# Patient Record
Sex: MALE | Race: OTHER | HISPANIC OR LATINO | Employment: STUDENT | URBAN - METROPOLITAN AREA
[De-identification: names, ages, dates, MRNs, and addresses within clinical notes are randomized per-mention and may not be internally consistent; named-entity substitution may affect disease eponyms.]

---
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
## 2023-01-31 ENCOUNTER — TELEPHONE (OUTPATIENT)
Dept: INTERNAL MEDICINE CLINIC | Facility: CLINIC | Age: 18
End: 2023-01-31

## 2023-01-31 DIAGNOSIS — B35.6 FUNGAL INFECTION OF THE GROIN: Primary | ICD-10-CM

## 2023-01-31 RX ORDER — NYSTATIN 100000 [USP'U]/G
POWDER TOPICAL 4 TIMES DAILY
Qty: 15 G | Refills: 0 | Status: SHIPPED | OUTPATIENT
Start: 2023-01-31

## 2023-01-31 NOTE — TELEPHONE ENCOUNTER
Patient left message on refill line would like nystatin powder ordered instead of the cream please send to [facility] pharmacy [address]

## 2023-02-06 DIAGNOSIS — A60.02 HERPES GENITALIS IN MEN: ICD-10-CM

## 2023-02-06 RX ORDER — VALACYCLOVIR HYDROCHLORIDE 1 G/1
1000 TABLET, FILM COATED ORAL DAILY
Qty: 90 TABLET | Refills: 1 | Status: SHIPPED | OUTPATIENT
Start: 2023-02-06 | End: 2023-08-05

## 2023-05-30 DIAGNOSIS — B35.6 FUNGAL INFECTION OF THE GROIN: ICD-10-CM

## 2023-05-30 RX ORDER — NYSTATIN 100000 [USP'U]/G
POWDER TOPICAL 4 TIMES DAILY
Qty: 15 G | Refills: 5 | Status: SHIPPED | OUTPATIENT
Start: 2023-05-30

## 2023-06-07 DIAGNOSIS — B35.6 FUNGAL INFECTION OF THE GROIN: ICD-10-CM

## 2023-06-07 NOTE — TELEPHONE ENCOUNTER
Medication Refill Request     Name nystatin (MYCOSTATIN) powder   Dose/Frequency Apply topically 4 (four) times a day  Quantity 15 g   Verified pharmacy   [x]  Verified ordering Provider   [x]  Does patient have enough for the next 3 days?  Yes [] No [x]

## 2023-06-08 RX ORDER — NYSTATIN 100000 [USP'U]/G
POWDER TOPICAL 4 TIMES DAILY
Qty: 15 G | Refills: 0 | OUTPATIENT
Start: 2023-06-08

## 2023-08-30 DIAGNOSIS — B35.6 FUNGAL INFECTION OF THE GROIN: ICD-10-CM

## 2023-08-30 DIAGNOSIS — A60.02 HERPES GENITALIS IN MEN: ICD-10-CM

## 2023-08-30 RX ORDER — VALACYCLOVIR HYDROCHLORIDE 1 G/1
1000 TABLET, FILM COATED ORAL DAILY
Qty: 90 TABLET | Refills: 1 | Status: SHIPPED | OUTPATIENT
Start: 2023-08-30 | End: 2024-02-26

## 2023-08-30 RX ORDER — NYSTATIN 100000 [USP'U]/G
POWDER TOPICAL 4 TIMES DAILY
Qty: 15 G | Refills: 5 | Status: SHIPPED | OUTPATIENT
Start: 2023-08-30

## 2023-12-18 DIAGNOSIS — B35.6 FUNGAL INFECTION OF THE GROIN: ICD-10-CM

## 2023-12-18 DIAGNOSIS — A60.02 HERPES GENITALIS IN MEN: ICD-10-CM

## 2023-12-18 RX ORDER — NYSTATIN 100000 [USP'U]/G
POWDER TOPICAL 4 TIMES DAILY
Qty: 30 G | Refills: 5 | Status: SHIPPED | OUTPATIENT
Start: 2023-12-18

## 2023-12-18 RX ORDER — VALACYCLOVIR HYDROCHLORIDE 1 G/1
1000 TABLET, FILM COATED ORAL DAILY
Qty: 90 TABLET | Refills: 1 | Status: SHIPPED | OUTPATIENT
Start: 2023-12-18 | End: 2024-06-15

## 2024-01-19 ENCOUNTER — OFFICE VISIT (OUTPATIENT)
Age: 19
End: 2024-01-19
Payer: COMMERCIAL

## 2024-01-19 VITALS
BODY MASS INDEX: 28.29 KG/M2 | TEMPERATURE: 98 F | HEART RATE: 79 BPM | HEIGHT: 69 IN | SYSTOLIC BLOOD PRESSURE: 106 MMHG | OXYGEN SATURATION: 98 % | WEIGHT: 191 LBS | DIASTOLIC BLOOD PRESSURE: 72 MMHG

## 2024-01-19 DIAGNOSIS — Z00.00 ANNUAL PHYSICAL EXAM: Primary | ICD-10-CM

## 2024-01-19 DIAGNOSIS — Z23 ENCOUNTER FOR IMMUNIZATION: ICD-10-CM

## 2024-01-19 PROCEDURE — 90715 TDAP VACCINE 7 YRS/> IM: CPT

## 2024-01-19 PROCEDURE — 99395 PREV VISIT EST AGE 18-39: CPT | Performed by: NURSE PRACTITIONER

## 2024-01-19 PROCEDURE — 90471 IMMUNIZATION ADMIN: CPT

## 2024-01-19 NOTE — ASSESSMENT & PLAN NOTE
Patient is to continue with well-balanced diet and daily exercise.  Patient defers influenza vaccination however is agreeable to Tdap vaccination while in office today.  Patient due for fasting blood work.  Encourage monthly self-testicular examinations.  Follow-up in 1 year for annual physical or sooner if needed.

## 2024-01-19 NOTE — PROGRESS NOTES
[facility] PRIMARY CARE [facility] Adult Male Physical Visit  Patient ID: Gilberto Ward    : 2005  Age/Gender: 18 y.o. male     DATE: 2024      Assessment/Plan:    Annual physical exam  Patient is to continue with well-balanced diet and daily exercise.  Patient defers influenza vaccination however is agreeable to Tdap vaccination while in office today.  Patient due for fasting blood work.  Encourage monthly self-testicular examinations.  Follow-up in 1 year for annual physical or sooner if needed.    BMI Counseling: Body mass index is 27.81 kg/m². The BMI is above normal. Nutrition recommendations include decreasing portion sizes, encouraging healthy choices of fruits and vegetables, decreasing fast food intake, consuming healthier snacks, limiting drinks that contain sugar, moderation in carbohydrate intake, increasing intake of lean protein, reducing intake of saturated and trans fat and reducing intake of cholesterol. Exercise recommendations include moderate physical activity 150 minutes/week and exercising 3-5 times per week. Rationale for BMI follow-up plan is due to patient being overweight or obese.     Depression Screening and Follow-up Plan: Patient was screened for depression during today's encounter. They screened negative with a PHQ-2 score of 0.       Diagnoses and all orders for this visit:    Annual physical exam  -     CBC and differential  -     Comprehensive metabolic panel; Future  -     Lipid panel            Subjective:     Gilberto Ward is a 18 y.o. male who presents to the office on 2024 for a health maintenance physical.    HPI  The following portions of the patient's history were reviewed and updated as appropriate: allergies, current medications, past family history, past medical history, past social history, past surgical history and problem list.    Pt reports overall health:  Good  Last Physical:     Healthy  Diet: well balanced  Routine Exercise:2-3 days a week   Weight Concerns: denies    Problems with vision:denies new changes, wears glasses   Last Eye Exam: 2023      Problems with Hearing:  Denies    Routine Dental Exams: every 6 months    Smoking History: ocassional, vape  ETOH Use: denies  Illegal Drug Use: denies  Caffeine Use: denies    Reproductive Health:    Sexually Active:  Denies  Testicular self exam: denies    Depression Screening:  PHQ-2/9 Depression Screening    Little interest or pleasure in doing things: 0 - not at all  Feeling down, depressed, or hopeless: 0 - not at all  PHQ-2 Score: 0  PHQ-2 Interpretation: Negative depression screen         Family History of Colon CA: his grandmother     Last Labs:  2021    Review of Systems   Constitutional:  Negative for activity change, appetite change, chills, diaphoresis and fever.   HENT:  Negative for congestion, ear discharge, ear pain, postnasal drip, rhinorrhea, sinus pressure, sinus pain and sore throat.    Eyes:  Negative for pain, discharge, itching and visual disturbance.   Respiratory:  Negative for cough, chest tightness, shortness of breath and wheezing.    Cardiovascular:  Negative for chest pain, palpitations and leg swelling.   Gastrointestinal:  Negative for abdominal pain, blood in stool, constipation, diarrhea, nausea and vomiting.   Endocrine: Negative for polydipsia, polyphagia and polyuria.   Genitourinary:  Negative for difficulty urinating, dysuria, hematuria and urgency.   Musculoskeletal:  Negative for arthralgias, back pain and neck pain.   Skin:  Negative for rash and wound.   Neurological:  Negative for dizziness, weakness, numbness and headaches.         Patient Active Problem List   Diagnosis    Herpes genitalis in men    Fungal infection of the groin    Exercise-induced asthma    Annual physical exam       Past Medical History:   Diagnosis Date    Asthma     Dysuria 8/23/2021    Migraine     Mixed hyperlipidemia     Pneumonia         History reviewed. No pertinent surgical history.      Current Outpatient Medications:     albuterol (PROVENTIL HFA,VENTOLIN HFA) 90 mcg/act inhaler, Inhale 1 puff every 6 (six) hours as needed , Disp: , Rfl:     nystatin (MYCOSTATIN) cream, Apply topically 2 (two) times a day, Disp: 30 g, Rfl: 5    nystatin (MYCOSTATIN) powder, Apply topically 4 (four) times a day, Disp: 30 g, Rfl: 5    valACYclovir (VALTREX) 1,000 mg tablet, Take 1 tablet (1,000 mg total) by mouth daily, Disp: 90 tablet, Rfl: 1    No Known Allergies    Social History     Socioeconomic History    Marital status: Single     Spouse name: None    Number of children: None    Years of education: None    Highest education level: None   Occupational History    None   Tobacco Use    Smoking status: Never    Smokeless tobacco: Never   Vaping Use    Vaping status: Some Days    Substances: Nicotine, Flavoring   Substance and Sexual Activity    Alcohol use: Not Currently    Drug use: Never    Sexual activity: None   Other Topics Concern    None   Social History Narrative    None     Social Determinants of Health     Financial Resource Strain: Not on file   Food Insecurity: Not on file   Transportation Needs: Not on file   Physical Activity: Not on file   Stress: Not on file   Social Connections: Not on file   Intimate Partner Violence: Not on file   Housing Stability: Not on file       Family History   Problem Relation Age of Onset    No Known Problems Mother     No Known Problems Father        Immunization History   Administered Date(s) Administered    DTaP 06/29/2005, 08/21/2008    DTaP / Hep B / IPV 2003, 2003, 04/14/2004    Hep A, ped/adol, 2 dose 08/21/2008, 11/12/2009    Hepatitis A 08/21/2008, 11/12/2009    HiB 2003, 2003, 04/14/2004, 07/26/2004    IPV 08/21/2008    MMR 06/29/2005, 08/21/2008    Meningococcal MCV4P 12/09/2013, 03/04/2022, 03/04/2022    Pneumococcal Conjugate PCV 7 2003, 2003, 06/29/2005    Tdap  "12/09/2013    Varicella 07/26/2004, 08/21/2008        Health Maintenance   Topic Date Due    Hepatitis C Screening  Never done    COVID-19 Vaccine (1) Never done    Pneumococcal Vaccine: Pediatrics (0 to 5 Years) and At-Risk Patients (6 to 64 Years) (1 - PCV) 06/27/2009    HPV Vaccine (1 - Male 2-dose series) Never done    HIV Screening  Never done    Influenza Vaccine (1) Never done    DTaP,Tdap,and Td Vaccines (7 - Td or Tdap) 12/09/2023    Depression Screening  01/19/2025    Annual Physical  01/19/2025    Zoster Vaccine (1 of 2) 06/27/2053    HIB Vaccine  Completed    IPV Vaccine  Completed    Hepatitis A Vaccine  Completed    Meningococcal ACWY Vaccine  Completed       Objective:  Vitals:    01/19/24 0939   BP: 106/72   BP Location: Left arm   Patient Position: Sitting   Cuff Size: Standard   Pulse: 79   Temp: 98 °F (36.7 °C)   TempSrc: Temporal   SpO2: 98%   Weight: 86.6 kg (191 lb)   Height: 5' 9.49\" (1.765 m)     Wt Readings from Last 3 Encounters:   01/19/24 86.6 kg (191 lb)   10/03/22 86.1 kg (189 lb 12.8 oz) (88%, Z= 1.20)*   01/24/22 80.6 kg (177 lb 9.6 oz) (83%, Z= 0.94)*     * Growth percentiles are based on CDC (Boys, 2-20 Years) data.     Body mass index is 27.81 kg/m².  No results found.       Physical Exam  Chaperone present: Patient defers testicular exam.   Constitutional:       General: He is not in acute distress.     Appearance: He is well-developed. He is not diaphoretic.   HENT:      Head: Normocephalic and atraumatic.      Right Ear: External ear normal.      Left Ear: External ear normal.      Nose: Nose normal.      Mouth/Throat:      Mouth: Mucous membranes are moist.      Pharynx: No oropharyngeal exudate or posterior oropharyngeal erythema.   Eyes:      General:         Right eye: No discharge.         Left eye: No discharge.      Conjunctiva/sclera: Conjunctivae normal.      Pupils: Pupils are equal, round, and reactive to light.   Neck:      Thyroid: No thyromegaly.   Cardiovascular: "      Rate and Rhythm: Normal rate and regular rhythm.      Heart sounds: Normal heart sounds. No murmur heard.     No friction rub. No gallop.   Pulmonary:      Effort: Pulmonary effort is normal. No respiratory distress.      Breath sounds: Normal breath sounds. No stridor. No wheezing or rales.   Abdominal:      General: Bowel sounds are normal. There is no distension.      Palpations: Abdomen is soft.      Tenderness: There is no abdominal tenderness.   Musculoskeletal:      Cervical back: Normal range of motion and neck supple.   Lymphadenopathy:      Cervical: No cervical adenopathy.   Skin:     General: Skin is warm and dry.      Findings: No erythema or rash.   Neurological:      Mental Status: He is alert and oriented to person, place, and time.   Psychiatric:         Behavior: Behavior normal.         Thought Content: Thought content normal.         Judgment: Judgment normal.             Future Appointments   Date Time Provider Department Center   1/20/2025  8:00 AM COLT Sandoval [facility]  [facility] CARE [place]    Patient Care Team:  COLT Sandoval as PCP - General (Family Medicine)

## 2024-11-27 ENCOUNTER — NURSE TRIAGE (OUTPATIENT)
Dept: OTHER | Facility: OTHER | Age: 19
End: 2024-11-27

## 2024-11-27 DIAGNOSIS — A60.02 HERPES GENITALIS IN MEN: ICD-10-CM

## 2024-11-27 NOTE — TELEPHONE ENCOUNTER
"Patient requesting refill of valtrex 1000mg daily. No symptoms. Confirmed pharmacy. Patient advised may take 2-3 days for request to be processed. Call back precautions given. Patient verbalized understanding.     Reason for Disposition   [1] Prescription refill request for NON-ESSENTIAL medicine (i.e., no harm to patient if med not taken) AND [2] triager unable to refill per department policy    Answer Assessment - Initial Assessment Questions  1. DRUG NAME: \"What medicine do you need to have refilled?\"      Valtrex 1000 mg       2. REFILLS REMAINING: \"How many refills are remaining?\" (Note: The label on the medicine or pill bottle will show how many refills are remaining. If there are no refills remaining, then a renewal may be needed.)      0    3. EXPIRATION DATE: \"What is the expiration date?\" (Note: The label states when the prescription will , and thus can no longer be refilled.)      2024    4. PRESCRIBING HCP: \"Who prescribed it?\" Reason: If prescribed by specialist, call should be referred to that group.      Taryn Oliva    5. SYMPTOMS: \"Do you have any symptoms?\"      no    Protocols used: Medication Refill and Renewal Call-Adult-    "

## 2024-12-02 RX ORDER — VALACYCLOVIR HYDROCHLORIDE 1 G/1
1000 TABLET, FILM COATED ORAL DAILY
Qty: 90 TABLET | Refills: 0 | Status: SHIPPED | OUTPATIENT
Start: 2024-12-02 | End: 2025-05-31

## 2025-01-19 ENCOUNTER — TELEPHONE (OUTPATIENT)
Dept: OTHER | Facility: OTHER | Age: 20
End: 2025-01-19

## 2025-03-04 ENCOUNTER — OFFICE VISIT (OUTPATIENT)
Age: 20
End: 2025-03-04
Payer: COMMERCIAL

## 2025-03-04 VITALS
SYSTOLIC BLOOD PRESSURE: 110 MMHG | HEIGHT: 70 IN | HEART RATE: 64 BPM | TEMPERATURE: 98 F | WEIGHT: 181.8 LBS | BODY MASS INDEX: 26.03 KG/M2 | OXYGEN SATURATION: 96 % | DIASTOLIC BLOOD PRESSURE: 72 MMHG

## 2025-03-04 DIAGNOSIS — F32.2 SEVERE MAJOR DEPRESSIVE DISORDER (HCC): ICD-10-CM

## 2025-03-04 DIAGNOSIS — J45.990 EXERCISE-INDUCED ASTHMA: ICD-10-CM

## 2025-03-04 DIAGNOSIS — Z00.00 ANNUAL PHYSICAL EXAM: ICD-10-CM

## 2025-03-04 DIAGNOSIS — Z13.228 SCREENING FOR METABOLIC DISORDER: Primary | ICD-10-CM

## 2025-03-04 DIAGNOSIS — A60.02 HERPES GENITALIS IN MEN: ICD-10-CM

## 2025-03-04 DIAGNOSIS — E78.5 HYPERLIPIDEMIA, UNSPECIFIED HYPERLIPIDEMIA TYPE: ICD-10-CM

## 2025-03-04 PROCEDURE — 99213 OFFICE O/P EST LOW 20 MIN: CPT | Performed by: NURSE PRACTITIONER

## 2025-03-04 PROCEDURE — 99395 PREV VISIT EST AGE 18-39: CPT | Performed by: NURSE PRACTITIONER

## 2025-03-04 NOTE — ASSESSMENT & PLAN NOTE
Patient is to continue with well-balanced diet and daily exercise.  Patient defers influenza vaccination.  Discussed HPV vaccination, fact sheet given to patient.  Patient due for fasting blood work.  Encourage monthly self-testicular examinations.  Follow-up in 1 year for annual physical or sooner if needed.

## 2025-03-04 NOTE — ASSESSMENT & PLAN NOTE
Instructed to take 2g daily for outbreaks  If he has <10 outbreaks we can decrease suppression dose to 500mg daily  Instructed to wear protection with sexual intercourse at all times

## 2025-03-04 NOTE — PROGRESS NOTES
[facility] - [facility]  Well [facility] Male [facility] Visit  Patient ID: Gilberto Ward    : 2005  Age/Gender: 19 y.o. male     DATE: 3/4/2025      Assessment/Plan:    Severe major depressive disorder (HCC)  Depression Screening Follow-up Plan: Patient's depression screening was positive with a PHQ-2 score of 3. Their PHQ-9 score was 7. Patient assessed for underlying major depression. They have no active suicidal ideations. Brief counseling provided and recommend additional follow-up/re-evaluation next office visit.  -Defers medications at this time  -Continue to follow with therapist    Exercise-induced asthma  Continue with albuterol inhaler, controlled with no recent exacerbations    Herpes genitalis in men  Instructed to take 2g daily for outbreaks  If he has <10 outbreaks we can decrease suppression dose to 500mg daily  Instructed to wear protection with sexual intercourse at all times    Annual physical exam  Patient is to continue with well-balanced diet and daily exercise.  Patient defers influenza vaccination.  Discussed HPV vaccination, fact sheet given to patient.  Patient due for fasting blood work.  Encourage monthly self-testicular examinations.  Follow-up in 1 year for annual physical or sooner if needed.      BMI Counseling: Body mass index is 26.35 kg/m². The BMI is above normal. Nutrition recommendations include decreasing portion sizes, encouraging healthy choices of fruits and vegetables, decreasing fast food intake, consuming healthier snacks, limiting drinks that contain sugar, moderation in carbohydrate intake, increasing intake of lean protein, reducing intake of saturated and trans fat and reducing intake of cholesterol. Exercise recommendations include moderate physical activity 150 minutes/week and exercising 3-5 times per week. Rationale for BMI follow-up plan is due to patient being overweight or obese.     Depression Screening  and Follow-up Plan: Patient's depression screening was positive with a PHQ-2 score of 3. Their PHQ-9 score was 7.   Patient assessed for underlying major depression. Brief counseling provided and recommend additional follow-up/re-evaluation next office visit.     Tobacco Cessation Counseling: Tobacco cessation counseling was provided. The patient is sincerely urged to quit consumption of tobacco. He is not ready to quit tobacco. Medication options and side effects of medication not discussed. Patient refused medication.        Diagnoses and all orders for this visit:    Screening for metabolic disorder  -     Comprehensive metabolic panel  -     CBC and differential  -     Lipid panel    Hyperlipidemia, unspecified hyperlipidemia type  -     Lipid panel    Severe major depressive disorder (HCC)    Exercise-induced asthma    Herpes genitalis in men    Annual physical exam              Subjective:     Gilberto Ward is a 19 y.o. male who presents to the office on 3/4/2025 for a health maintenance physical.    Patient presents today to follow-up and annual physical.   Denies new concerns.     Exercise induced asthma-currently well controlled with albuterol inhaler, reports no exacerbations    Herpes-denies recent outbreak, continues Valtrex as needed      The following portions of the patient's history were reviewed and updated as appropriate: allergies, current medications, past family history, past medical history, past social history, past surgical history and problem list.    Pt reports overall health:  Good  Last Physical: 2024    Healthy Diet: well balanced  Routine Exercise:3 days a week   Weight Concerns: denies    Problems with vision:denies new changes, wears glasses   Last Eye Exam: 2024      Problems with Hearing:  Denies    Routine Dental Exams: every 6 months    Smoking History: ocassional, vape  ETOH Use: one or twice a week (15 drinks at a time)  Illegal Drug Use: marijuana   Caffeine Use:  denies    Reproductive Health:    Sexually Active:  currently, wears condoms   Testicular self exam: denies    Depression Screening:  PHQ-2/9 Depression Screening    Little interest or pleasure in doing things: 2 - more than half the days  Feeling down, depressed, or hopeless: 1 - several days  Trouble falling or staying asleep, or sleeping too much: 0 - not at all  Feeling tired or having little energy: 1 - several days  Poor appetite or overeatin - not at all  Feeling bad about yourself - or that you are a failure or have let yourself or your family down: 1 - several days  Trouble concentrating on things, such as reading the newspaper or watching television: 2 - more than half the days  Moving or speaking so slowly that other people could have noticed. Or the opposite - being so fidgety or restless that you have been moving around a lot more than usual: 0 - not at all  Thoughts that you would be better off dead, or of hurting yourself in some way: 0 - not at all  PHQ-2 Score: 3  PHQ-2 Interpretation: POSITIVE depression screen  PHQ-9 Score: 7  PHQ-9 Interpretation: Mild depression         Family History of [place]: his grandmother     Last Labs:      Review of Systems   Constitutional:  Negative for activity change, appetite change, chills, diaphoresis and fever.   HENT:  Negative for congestion, ear discharge, ear pain, postnasal drip, rhinorrhea, sinus pressure, sinus pain and sore throat.    Eyes:  Negative for pain, discharge, itching and visual disturbance.   Respiratory:  Negative for cough, chest tightness, shortness of breath and wheezing.    Cardiovascular:  Negative for chest pain, palpitations and leg swelling.   Gastrointestinal:  Negative for abdominal pain, blood in stool, constipation, diarrhea, nausea and vomiting.   Endocrine: Negative for polydipsia, polyphagia and polyuria.   Genitourinary:  Negative for difficulty urinating, dysuria, hematuria and urgency.   Musculoskeletal:  Negative  for arthralgias, back pain and neck pain.   Skin:  Negative for rash and wound.   Neurological:  Negative for dizziness, weakness, numbness and headaches.         Patient Active Problem List   Diagnosis    Herpes genitalis in men    Fungal infection of the groin    Exercise-induced asthma    Annual physical exam    Severe major depressive disorder (HCC)       Past Medical History:   Diagnosis Date    Asthma     Dysuria 8/23/2021    Migraine     Mixed hyperlipidemia     Pneumonia        History reviewed. No pertinent surgical history.      Current Outpatient Medications:     albuterol (PROVENTIL HFA,VENTOLIN HFA) 90 mcg/act inhaler, Inhale 1 puff every 6 (six) hours as needed , Disp: , Rfl:     valACYclovir (VALTREX) 1,000 mg tablet, Take 1 tablet (1,000 mg total) by mouth daily, Disp: 90 tablet, Rfl: 0    nystatin (MYCOSTATIN) cream, Apply topically 2 (two) times a day (Patient not taking: Reported on 3/4/2025), Disp: 30 g, Rfl: 5    nystatin (MYCOSTATIN) powder, Apply topically 4 (four) times a day, Disp: 30 g, Rfl: 5    No Known Allergies    Social History     Socioeconomic History    Marital status: Single     Spouse name: None    Number of children: None    Years of education: None    Highest education level: None   Occupational History    None   Tobacco Use    Smoking status: Never    Smokeless tobacco: Never   Vaping Use    Vaping status: Some Days    Substances: Nicotine, Flavoring   Substance and Sexual Activity    Alcohol use: Yes     Comment: social    Drug use: Never    Sexual activity: None   Other Topics Concern    None   Social History Narrative    None     Social Drivers of Health     Financial Resource Strain: Not on file   Food Insecurity: Not on file   Transportation Needs: Not on file   Physical Activity: Not on file   Stress: Not on file   Social Connections: Not on file   Intimate Partner Violence: Not on file   Housing Stability: Not on file       Family History   Problem Relation Age of Onset  "   No Known Problems Mother     No Known Problems Father        Immunization History   Administered Date(s) Administered    DTaP 06/29/2005, 08/21/2008    DTaP / Hep B / IPV 2005, 2005, 04/14/2004    Hep A, ped/adol, 2 dose 08/21/2008, 11/12/2009    Hepatitis A 08/21/2008, 11/12/2009    HiB 2005, 2003, 04/14/2004, 07/26/2004    IPV 08/21/2008    MMR 06/29/2005, 08/21/2008    Meningococcal MCV4, Unspecified 12/09/2013    Meningococcal MCV4P 12/09/2013, 03/04/2022, 03/04/2022    Pneumococcal Conjugate PCV 7 2003, 2003, 06/29/2005    Tdap 12/09/2013, 01/19/2024    Varicella 07/26/2004, 08/21/2008        Health Maintenance   Topic Date Due    Hepatitis C Screening  Never done    Depression Follow-up Plan  Never done    HIV Screening  Never done    HPV Vaccine (1 - Male 3-dose series) Never done    Meningococcal B Vaccine (1 of 2 - Standard) Never done    Pneumococcal Vaccine: Pediatrics (0 to 5 Years) and At-Risk Patients (6 to 64 Years) (1 of 2 - PCV) 06/27/2022    COVID-19 Vaccine (1 - 2024-25 season) Never done    Annual Physical  01/19/2025    Influenza Vaccine (1) 06/30/2025 (Originally 9/1/2024)    Depression Screening  03/04/2026    DTaP,Tdap,and Td Vaccines (8 - Td or Tdap) 01/19/2034    Zoster Vaccine (1 of 2) 06/27/2053    HIB Vaccine  Completed    IPV Vaccine  Completed    Hepatitis A Vaccine  Completed    Meningococcal ACWY Vaccine  Completed    RSV Vaccine age 0-20 Months  Aged Out       Objective:  Vitals:    03/04/25 1006   BP: 110/72   BP Location: Left arm   Patient Position: Sitting   Cuff Size: Standard   Pulse: 64   Temp: 98 °F (36.7 °C)   TempSrc: Temporal   SpO2: 96%   Weight: 82.5 kg (181 lb 12.8 oz)   Height: 5' 9.65\" (1.769 m)     Wt Readings from Last 3 Encounters:   03/04/25 82.5 kg (181 lb 12.8 oz)   01/19/24 86.6 kg (191 lb)   10/03/22 86.1 kg (189 lb 12.8 oz) (88%, Z= 1.20)*     * Growth percentiles are based on CDC (Boys, 2-20 Years) data.     Body mass " index is 26.35 kg/m².  No results found.       Physical Exam  Constitutional:       General: He is not in acute distress.     Appearance: He is well-developed. He is not diaphoretic.   HENT:      Head: Normocephalic and atraumatic.      Right Ear: External ear normal.      Left Ear: External ear normal.      Nose: Nose normal.      Mouth/Throat:      Mouth: Mucous membranes are moist.      Pharynx: No oropharyngeal exudate or posterior oropharyngeal erythema.   Eyes:      General:         Right eye: No discharge.         Left eye: No discharge.      Conjunctiva/sclera: Conjunctivae normal.      Pupils: Pupils are equal, round, and reactive to light.   Neck:      Thyroid: No thyromegaly.   Cardiovascular:      Rate and Rhythm: Normal rate and regular rhythm.      Heart sounds: Normal heart sounds. No murmur heard.     No friction rub. No gallop.   Pulmonary:      Effort: Pulmonary effort is normal. No respiratory distress.      Breath sounds: Normal breath sounds. No stridor. No wheezing or rales.   Abdominal:      General: Bowel sounds are normal. There is no distension.      Palpations: Abdomen is soft.      Tenderness: There is no abdominal tenderness.   Musculoskeletal:      Cervical back: Normal range of motion and neck supple.   Lymphadenopathy:      Cervical: No cervical adenopathy.   Skin:     General: Skin is warm and dry.      Findings: No erythema or rash.   Neurological:      Mental Status: He is alert and oriented to person, place, and time.   Psychiatric:         Behavior: Behavior normal.         Thought Content: Thought content normal.         Judgment: Judgment normal.             Future Appointments   Date Time Provider Department Center   3/6/2026  7:20 AM COLT Sandoval [facility]           Taryn Oliva  [facility] CARE [place]    Patient Care Team:  COLT Sandoval as PCP - General (Family Medicine)

## 2025-03-04 NOTE — ASSESSMENT & PLAN NOTE
Depression Screening Follow-up Plan: Patient's depression screening was positive with a PHQ-2 score of 3. Their PHQ-9 score was 7. Patient assessed for underlying major depression. They have no active suicidal ideations. Brief counseling provided and recommend additional follow-up/re-evaluation next office visit.  -Defers medications at this time  -Continue to follow with therapist

## 2025-05-06 DIAGNOSIS — A60.02 HERPES GENITALIS IN MEN: ICD-10-CM

## 2025-05-06 RX ORDER — VALACYCLOVIR HYDROCHLORIDE 1 G/1
1000 TABLET, FILM COATED ORAL DAILY
Qty: 90 TABLET | Refills: 1 | Status: SHIPPED | OUTPATIENT
Start: 2025-05-06 | End: 2025-11-02

## 2025-05-06 NOTE — TELEPHONE ENCOUNTER
Reason for call:   [x] Refill   [] Prior Auth  [] Other:     Office:   [x] PCP/Provider - COLT Sandoval / [facility] [place]     [] Specialty/Provider -     Medication: valACYclovir (VALTREX) 1,000 mg tablet     Dose/Frequency:  Take 1 tablet (1,000 mg total) by mouth daily     Quantity: 90    Pharmacy: [facility]/pharmacy #[id number] [place] - [address]   Does the patient have enough for 3 days?   [] Yes   [x] No - Send as HP to POD    Mail Away Pharmacy   Does the patient have enough for 10 days?   [] Yes   [] No - Send as HP to POD